# Patient Record
Sex: MALE | Race: WHITE | Employment: FULL TIME | URBAN - METROPOLITAN AREA
[De-identification: names, ages, dates, MRNs, and addresses within clinical notes are randomized per-mention and may not be internally consistent; named-entity substitution may affect disease eponyms.]

---
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
## 2021-05-08 PROCEDURE — 96374 THER/PROPH/DIAG INJ IV PUSH: CPT

## 2021-05-08 PROCEDURE — 99284 EMERGENCY DEPT VISIT MOD MDM: CPT

## 2021-05-09 ENCOUNTER — APPOINTMENT (OUTPATIENT)
Dept: GENERAL RADIOLOGY | Age: 21
End: 2021-05-09
Payer: COMMERCIAL

## 2021-05-09 ENCOUNTER — HOSPITAL ENCOUNTER (EMERGENCY)
Age: 21
Discharge: HOME | End: 2021-05-09
Attending: EMERGENCY MEDICINE
Payer: COMMERCIAL

## 2021-05-09 VITALS
TEMPERATURE: 98.2 F | OXYGEN SATURATION: 98 % | DIASTOLIC BLOOD PRESSURE: 88 MMHG | SYSTOLIC BLOOD PRESSURE: 138 MMHG | HEART RATE: 80 BPM | RESPIRATION RATE: 16 BRPM

## 2021-05-09 DIAGNOSIS — R07.89 CHEST WALL PAIN: Primary | ICD-10-CM

## 2021-05-09 LAB
ANION GAP SERPL CALCULATED.3IONS-SCNC: 10 MMOL/L (ref 7–16)
BASOPHILS ABSOLUTE: 0.06 E9/L (ref 0–0.2)
BASOPHILS RELATIVE PERCENT: 0.6 % (ref 0–2)
BUN BLDV-MCNC: 17 MG/DL (ref 6–20)
CALCIUM SERPL-MCNC: 9.2 MG/DL (ref 8.6–10.2)
CHLORIDE BLD-SCNC: 103 MMOL/L (ref 98–107)
CO2: 26 MMOL/L (ref 22–29)
CREAT SERPL-MCNC: 0.9 MG/DL (ref 0.7–1.2)
EKG ATRIAL RATE: 84 BPM
EKG P AXIS: 44 DEGREES
EKG P-R INTERVAL: 154 MS
EKG Q-T INTERVAL: 344 MS
EKG QRS DURATION: 82 MS
EKG QTC CALCULATION (BAZETT): 406 MS
EKG R AXIS: 61 DEGREES
EKG T AXIS: 53 DEGREES
EKG VENTRICULAR RATE: 84 BPM
EOSINOPHILS ABSOLUTE: 0.03 E9/L (ref 0.05–0.5)
EOSINOPHILS RELATIVE PERCENT: 0.3 % (ref 0–6)
GFR AFRICAN AMERICAN: >60
GFR NON-AFRICAN AMERICAN: >60 ML/MIN/1.73
GLUCOSE BLD-MCNC: 100 MG/DL (ref 74–99)
HCT VFR BLD CALC: 40.7 % (ref 37–54)
HEMOGLOBIN: 13.7 G/DL (ref 12.5–16.5)
IMMATURE GRANULOCYTES #: 0.02 E9/L
IMMATURE GRANULOCYTES %: 0.2 % (ref 0–5)
LYMPHOCYTES ABSOLUTE: 2.28 E9/L (ref 1.5–4)
LYMPHOCYTES RELATIVE PERCENT: 24 % (ref 20–42)
MCH RBC QN AUTO: 26.8 PG (ref 26–35)
MCHC RBC AUTO-ENTMCNC: 33.7 % (ref 32–34.5)
MCV RBC AUTO: 79.5 FL (ref 80–99.9)
MONOCYTES ABSOLUTE: 0.68 E9/L (ref 0.1–0.95)
MONOCYTES RELATIVE PERCENT: 7.2 % (ref 2–12)
NEUTROPHILS ABSOLUTE: 6.43 E9/L (ref 1.8–7.3)
NEUTROPHILS RELATIVE PERCENT: 67.7 % (ref 43–80)
PDW BLD-RTO: 14.2 FL (ref 11.5–15)
PLATELET # BLD: 220 E9/L (ref 130–450)
PMV BLD AUTO: 8.8 FL (ref 7–12)
POTASSIUM SERPL-SCNC: 3.5 MMOL/L (ref 3.5–5)
RBC # BLD: 5.12 E12/L (ref 3.8–5.8)
SODIUM BLD-SCNC: 139 MMOL/L (ref 132–146)
TROPONIN: <0.01 NG/ML (ref 0–0.03)
WBC # BLD: 9.5 E9/L (ref 4.5–11.5)

## 2021-05-09 PROCEDURE — 71045 X-RAY EXAM CHEST 1 VIEW: CPT

## 2021-05-09 PROCEDURE — 36415 COLL VENOUS BLD VENIPUNCTURE: CPT

## 2021-05-09 PROCEDURE — 6360000002 HC RX W HCPCS: Performed by: EMERGENCY MEDICINE

## 2021-05-09 PROCEDURE — 85025 COMPLETE CBC W/AUTO DIFF WBC: CPT

## 2021-05-09 PROCEDURE — 93005 ELECTROCARDIOGRAM TRACING: CPT

## 2021-05-09 PROCEDURE — 80048 BASIC METABOLIC PNL TOTAL CA: CPT

## 2021-05-09 PROCEDURE — 84484 ASSAY OF TROPONIN QUANT: CPT

## 2021-05-09 RX ORDER — IBUPROFEN 800 MG/1
800 TABLET ORAL EVERY 8 HOURS PRN
Qty: 15 TABLET | Refills: 0 | Status: SHIPPED | OUTPATIENT
Start: 2021-05-09 | End: 2021-05-14

## 2021-05-09 RX ORDER — FAMOTIDINE 20 MG/1
20 TABLET, FILM COATED ORAL 2 TIMES DAILY
Qty: 14 TABLET | Refills: 0 | Status: SHIPPED | OUTPATIENT
Start: 2021-05-09 | End: 2021-05-16

## 2021-05-09 RX ORDER — KETOROLAC TROMETHAMINE 30 MG/ML
30 INJECTION, SOLUTION INTRAMUSCULAR; INTRAVENOUS ONCE
Status: COMPLETED | OUTPATIENT
Start: 2021-05-09 | End: 2021-05-09

## 2021-05-09 RX ADMIN — KETOROLAC TROMETHAMINE 30 MG: 30 INJECTION, SOLUTION INTRAMUSCULAR; INTRAVENOUS at 00:56

## 2021-05-09 NOTE — ED PROVIDER NOTES
HPI:  5/9/21, Time: 1:43 AM EDT        Leni Gomez is a 24 y.o. male presenting to the ED for chest pain, beginning 4 to 6 hours ago. The complaint has been persistent, moderate in severity, and worsened by nothing. Patient states he was working on his car () for the majority of the day. Has not had any fever/chills, no coughing, no) production or hemoptysis reported. Patient is a had a sharp stabbing pain nor any pain radiating to the back nor to the neck/jaw nor to the left upper extremity. No relieving factors are reported. Patient rated his pain at 5/10 severity. No relieving factors reported. No other complaints. Review of Systems:   A complete review of systems was performed and pertinent positives and negatives are stated within HPI, all other systems reviewed and are negative.    --------------------------------------------- PAST HISTORY ---------------------------------------------  Past Medical History:  has no past medical history on file. Past Surgical History:  has a past surgical history that includes Tonsillectomy and Adenoidectomy. Social History:  reports that he has never smoked. He has never used smokeless tobacco. He reports that he does not drink alcohol or use drugs. Family History: family history is not on file. The patients home medications have been reviewed. Allergies: Patient has no known allergies.     -------------------------------------------------- RESULTS -------------------------------------------------  All laboratory and radiology results have been personally reviewed by myself   LABS:  Results for orders placed or performed during the hospital encounter of 05/09/21   Troponin   Result Value Ref Range    Troponin <0.01 0.00 - 0.03 ng/mL   Basic metabolic panel   Result Value Ref Range    Sodium 139 132 - 146 mmol/L    Potassium 3.5 3.5 - 5.0 mmol/L    Chloride 103 98 - 107 mmol/L    CO2 26 22 - 29 mmol/L    Anion Gap 10 7 - 16 mmol/L Glucose 100 (H) 74 - 99 mg/dL    BUN 17 6 - 20 mg/dL    CREATININE 0.9 0.7 - 1.2 mg/dL    GFR Non-African American >60 >=60 mL/min/1.73    GFR African American >60     Calcium 9.2 8.6 - 10.2 mg/dL   CBC auto differential   Result Value Ref Range    WBC 9.5 4.5 - 11.5 E9/L    RBC 5.12 3.80 - 5.80 E12/L    Hemoglobin 13.7 12.5 - 16.5 g/dL    Hematocrit 40.7 37.0 - 54.0 %    MCV 79.5 (L) 80.0 - 99.9 fL    MCH 26.8 26.0 - 35.0 pg    MCHC 33.7 32.0 - 34.5 %    RDW 14.2 11.5 - 15.0 fL    Platelets 305 503 - 059 E9/L    MPV 8.8 7.0 - 12.0 fL    Neutrophils % 67.7 43.0 - 80.0 %    Immature Granulocytes % 0.2 0.0 - 5.0 %    Lymphocytes % 24.0 20.0 - 42.0 %    Monocytes % 7.2 2.0 - 12.0 %    Eosinophils % 0.3 0.0 - 6.0 %    Basophils % 0.6 0.0 - 2.0 %    Neutrophils Absolute 6.43 1.80 - 7.30 E9/L    Immature Granulocytes # 0.02 E9/L    Lymphocytes Absolute 2.28 1.50 - 4.00 E9/L    Monocytes Absolute 0.68 0.10 - 0.95 E9/L    Eosinophils Absolute 0.03 (L) 0.05 - 0.50 E9/L    Basophils Absolute 0.06 0.00 - 0.20 E9/L       RADIOLOGY:  Interpreted by Radiologist.  XR CHEST PORTABLE   Final Result   No acute abnormality identified.             ------------------------- NURSING NOTES AND VITALS REVIEWED ---------------------------   The nursing notes within the ED encounter and vital signs as below have been reviewed. /88   Pulse 80   Temp 98.2 °F (36.8 °C) (Temporal)   Resp 16   SpO2 98%   Oxygen Saturation Interpretation: Normal      ---------------------------------------------------PHYSICAL EXAM--------------------------------------      Constitutional/General: Alert and oriented x3, well appearing, non toxic in mild distress   Head: Normocephalic and atraumatic  Eyes: PERRL, EOMI  Mouth: Oropharynx clear, handling secretions, no trismus  Neck: Supple, full ROM,   Pulmonary: Lungs clear to auscultation bilaterally, no wheezes, rales, or rhonchi.  Not in respiratory distress  Cardiovascular:  Regular rate and rhythm, no murmurs, gallops, or rubs. 2+ distal pulses  GI: Abdomen soft, non tender, non distended,   Extremities: Moves all extremities x 4. Warm and well perfused; pulses bilaterally symmetric no clubbing cyanosis or edema. No motor weakness  Skin: warm and dry without rash  Lymphatic: No evidence of any enlarged lymph nodes  Neurologic: GCS 15, cranial nerves grossly intact no focal deficits. No meningeal signs  Psych: Normal Affect    ------------------------------ ED COURSE/MEDICAL DECISION MAKING----------------------  Medications   ketorolac (TORADOL) injection 30 mg (30 mg Intravenous Given 5/9/21 0056)       ED COURSE:     Medical Decision Making:   Differential diagnoses includes:  Chest wall strain, GERD, pneumonia, pneumothorax, aortic dissection, pericarditis, to name a few. Pt's HEART Score = 1 point, Low Risk Score  Patient's chest x-ray did not show any focal infiltrates nor any evidence of pneumothorax. There were no mediastinal abnormalities to suggest aortic dissection. Patient clinical history do not suggest aortic dissection with no sharp stabbing pain nor any pain radiating to the back nor abdomen. There was no evidence of any pulse deficits on evaluation clinically as well. EKG #1:  Interpreted by emergency department physician unless otherwise noted. Time:      Rate: 80 bpm  Rhythm: Sinus rhythm. Interpretation: with nonspecific T wave changes. Comparison: There are no previous tracings available for comparison. Counseling: The emergency provider has spoken with the patient and discussed todays results, in addition to providing specific details for the plan of care and counseling regarding the diagnosis and prognosis. Questions are answered at this time and they are agreeable with the plan.      --------------------------------- IMPRESSION AND DISPOSITION ---------------------------------    IMPRESSION  1.  Chest wall pain        DISPOSITION  Disposition: Discharge to home  Patient